# Patient Record
Sex: MALE | Race: WHITE | Employment: FULL TIME | URBAN - METROPOLITAN AREA
[De-identification: names, ages, dates, MRNs, and addresses within clinical notes are randomized per-mention and may not be internally consistent; named-entity substitution may affect disease eponyms.]

---

## 2021-01-27 ENCOUNTER — HOSPITAL ENCOUNTER (OUTPATIENT)
Age: 81
Setting detail: OUTPATIENT SURGERY
Discharge: HOME | End: 2021-01-27
Attending: INTERNAL MEDICINE | Admitting: INTERNAL MEDICINE
Payer: MEDICARE

## 2021-01-27 VITALS
RESPIRATION RATE: 20 BRPM | SYSTOLIC BLOOD PRESSURE: 145 MMHG | DIASTOLIC BLOOD PRESSURE: 81 MMHG | BODY MASS INDEX: 35.06 KG/M2 | HEART RATE: 64 BPM | TEMPERATURE: 98.3 F | HEIGHT: 75 IN | OXYGEN SATURATION: 95 % | WEIGHT: 282 LBS

## 2021-01-27 DIAGNOSIS — I25.10 CORONARY ARTERY DISEASE DUE TO LIPID RICH PLAQUE: ICD-10-CM

## 2021-01-27 DIAGNOSIS — I25.83 CORONARY ARTERY DISEASE DUE TO LIPID RICH PLAQUE: ICD-10-CM

## 2021-01-27 LAB
ANION GAP SERPL CALC-SCNC: 7 MMOL/L (ref 5–15)
BASOPHILS # BLD: 0 K/UL (ref 0–0.1)
BASOPHILS NFR BLD: 1 % (ref 0–1)
BUN SERPL-MCNC: 14 MG/DL (ref 6–20)
BUN/CREAT SERPL: 17 (ref 12–20)
CALCIUM SERPL-MCNC: 9.4 MG/DL (ref 8.5–10.1)
CHLORIDE SERPL-SCNC: 110 MMOL/L (ref 97–108)
CO2 SERPL-SCNC: 28 MMOL/L (ref 21–32)
CREAT SERPL-MCNC: 0.83 MG/DL (ref 0.7–1.3)
DIFFERENTIAL METHOD BLD: ABNORMAL
EOSINOPHIL # BLD: 0.1 K/UL (ref 0–0.4)
EOSINOPHIL NFR BLD: 2 % (ref 0–7)
ERYTHROCYTE [DISTWIDTH] IN BLOOD BY AUTOMATED COUNT: 12.9 % (ref 11.5–14.5)
GLUCOSE SERPL-MCNC: 109 MG/DL (ref 65–100)
HCT VFR BLD AUTO: 44.3 % (ref 36.6–50.3)
HGB BLD-MCNC: 14.5 G/DL (ref 12.1–17)
IMM GRANULOCYTES # BLD AUTO: 0 K/UL (ref 0–0.04)
IMM GRANULOCYTES NFR BLD AUTO: 0 % (ref 0–0.5)
LYMPHOCYTES # BLD: 1.2 K/UL (ref 0.8–3.5)
LYMPHOCYTES NFR BLD: 22 % (ref 12–49)
MCH RBC QN AUTO: 30 PG (ref 26–34)
MCHC RBC AUTO-ENTMCNC: 32.7 G/DL (ref 30–36.5)
MCV RBC AUTO: 91.5 FL (ref 80–99)
MONOCYTES # BLD: 0.6 K/UL (ref 0–1)
MONOCYTES NFR BLD: 12 % (ref 5–13)
NEUTS SEG # BLD: 3.5 K/UL (ref 1.8–8)
NEUTS SEG NFR BLD: 63 % (ref 32–75)
NRBC # BLD: 0 K/UL (ref 0–0.01)
NRBC BLD-RTO: 0 PER 100 WBC
PLATELET # BLD AUTO: 145 K/UL (ref 150–400)
PMV BLD AUTO: 9.6 FL (ref 8.9–12.9)
POTASSIUM SERPL-SCNC: 4 MMOL/L (ref 3.5–5.1)
RBC # BLD AUTO: 4.84 M/UL (ref 4.1–5.7)
SODIUM SERPL-SCNC: 145 MMOL/L (ref 136–145)
WBC # BLD AUTO: 5.5 K/UL (ref 4.1–11.1)

## 2021-01-27 PROCEDURE — C1894 INTRO/SHEATH, NON-LASER: HCPCS | Performed by: INTERNAL MEDICINE

## 2021-01-27 PROCEDURE — C1769 GUIDE WIRE: HCPCS | Performed by: INTERNAL MEDICINE

## 2021-01-27 PROCEDURE — 93458 L HRT ARTERY/VENTRICLE ANGIO: CPT | Performed by: INTERNAL MEDICINE

## 2021-01-27 PROCEDURE — 99153 MOD SED SAME PHYS/QHP EA: CPT | Performed by: INTERNAL MEDICINE

## 2021-01-27 PROCEDURE — 77030010221 HC SPLNT WR POS TELE -B: Performed by: INTERNAL MEDICINE

## 2021-01-27 PROCEDURE — 80048 BASIC METABOLIC PNL TOTAL CA: CPT

## 2021-01-27 PROCEDURE — 85025 COMPLETE CBC W/AUTO DIFF WBC: CPT

## 2021-01-27 PROCEDURE — 77030019569 HC BND COMPR RAD TERU -B: Performed by: INTERNAL MEDICINE

## 2021-01-27 PROCEDURE — 99152 MOD SED SAME PHYS/QHP 5/>YRS: CPT | Performed by: INTERNAL MEDICINE

## 2021-01-27 PROCEDURE — 77030004532 HC CATH ANGI DX IMP BSC -A: Performed by: INTERNAL MEDICINE

## 2021-01-27 PROCEDURE — 74011250636 HC RX REV CODE- 250/636: Performed by: INTERNAL MEDICINE

## 2021-01-27 PROCEDURE — 74011000250 HC RX REV CODE- 250: Performed by: INTERNAL MEDICINE

## 2021-01-27 PROCEDURE — 74011000636 HC RX REV CODE- 636: Performed by: INTERNAL MEDICINE

## 2021-01-27 PROCEDURE — 36415 COLL VENOUS BLD VENIPUNCTURE: CPT

## 2021-01-27 RX ORDER — ZINC GLUCONATE 10 MG
LOZENGE ORAL
COMMUNITY

## 2021-01-27 RX ORDER — NITROGLYCERIN 0.4 MG/1
0.4 TABLET SUBLINGUAL AS NEEDED
Status: DISCONTINUED | OUTPATIENT
Start: 2021-01-27 | End: 2021-01-27 | Stop reason: HOSPADM

## 2021-01-27 RX ORDER — SODIUM CHLORIDE 9 MG/ML
3 INJECTION, SOLUTION INTRAVENOUS CONTINUOUS
Status: DISPENSED | OUTPATIENT
Start: 2021-01-27 | End: 2021-01-27

## 2021-01-27 RX ORDER — GUAIFENESIN 100 MG/5ML
81 LIQUID (ML) ORAL DAILY
Qty: 30 TAB | Refills: 5 | Status: SHIPPED | OUTPATIENT
Start: 2021-01-27

## 2021-01-27 RX ORDER — HEPARIN SODIUM 1000 [USP'U]/ML
INJECTION, SOLUTION INTRAVENOUS; SUBCUTANEOUS AS NEEDED
Status: DISCONTINUED | OUTPATIENT
Start: 2021-01-27 | End: 2021-01-27 | Stop reason: HOSPADM

## 2021-01-27 RX ORDER — ACETAMINOPHEN 325 MG/1
650 TABLET ORAL
Status: DISCONTINUED | OUTPATIENT
Start: 2021-01-27 | End: 2021-01-27 | Stop reason: HOSPADM

## 2021-01-27 RX ORDER — FENTANYL CITRATE 50 UG/ML
INJECTION, SOLUTION INTRAMUSCULAR; INTRAVENOUS AS NEEDED
Status: DISCONTINUED | OUTPATIENT
Start: 2021-01-27 | End: 2021-01-27 | Stop reason: HOSPADM

## 2021-01-27 RX ORDER — PETROLATUM,WHITE/LANOLIN
500 OINTMENT (GRAM) TOPICAL DAILY
COMMUNITY

## 2021-01-27 RX ORDER — ATORVASTATIN CALCIUM 10 MG/1
10 TABLET, FILM COATED ORAL DAILY
COMMUNITY

## 2021-01-27 RX ORDER — HEPARIN SODIUM 200 [USP'U]/100ML
INJECTION, SOLUTION INTRAVENOUS
Status: COMPLETED | OUTPATIENT
Start: 2021-01-27 | End: 2021-01-27

## 2021-01-27 RX ORDER — MIDAZOLAM HYDROCHLORIDE 1 MG/ML
INJECTION, SOLUTION INTRAMUSCULAR; INTRAVENOUS AS NEEDED
Status: DISCONTINUED | OUTPATIENT
Start: 2021-01-27 | End: 2021-01-27 | Stop reason: HOSPADM

## 2021-01-27 RX ORDER — GLUCOSAM/CHONDRO/HERB 149/HYAL 750-100 MG
1 TABLET ORAL DAILY
COMMUNITY

## 2021-01-27 RX ORDER — SODIUM CHLORIDE 9 MG/ML
1.5 INJECTION, SOLUTION INTRAVENOUS CONTINUOUS
Status: DISCONTINUED | OUTPATIENT
Start: 2021-01-27 | End: 2021-01-27 | Stop reason: HOSPADM

## 2021-01-27 RX ORDER — VERAPAMIL HYDROCHLORIDE 2.5 MG/ML
INJECTION, SOLUTION INTRAVENOUS AS NEEDED
Status: DISCONTINUED | OUTPATIENT
Start: 2021-01-27 | End: 2021-01-27 | Stop reason: HOSPADM

## 2021-01-27 RX ORDER — ASCORBIC ACID 500 MG
1000 TABLET ORAL
COMMUNITY

## 2021-01-27 RX ORDER — GLUCOSAMINE SULFATE 1500 MG
1000 POWDER IN PACKET (EA) ORAL DAILY
COMMUNITY

## 2021-01-27 RX ORDER — LIDOCAINE HYDROCHLORIDE 10 MG/ML
INJECTION INFILTRATION; PERINEURAL AS NEEDED
Status: DISCONTINUED | OUTPATIENT
Start: 2021-01-27 | End: 2021-01-27 | Stop reason: HOSPADM

## 2021-01-27 RX ADMIN — SODIUM CHLORIDE 1.5 ML/KG/HR: 900 INJECTION, SOLUTION INTRAVENOUS at 09:57

## 2021-01-27 RX ADMIN — SODIUM CHLORIDE 3 ML/KG/HR: 9 INJECTION, SOLUTION INTRAVENOUS at 08:00

## 2021-01-27 RX ADMIN — SODIUM CHLORIDE 1.5 ML/KG/HR: 900 INJECTION, SOLUTION INTRAVENOUS at 08:59

## 2021-01-27 NOTE — PROGRESS NOTES
Cardiac Cath Lab Procedure Area Arrival Note:    Caro Landrum arrived to Cardiac Cath Lab, Procedure Area. Patient identifiers verified with NAME and DATE OF BIRTH. Procedure verified with patient. Consent forms verified. Allergies verified. Patient informed of procedure and plan of care. Questions answered with review. Patient voiced understanding of procedure and plan of care. Patient on cardiac monitor, non-invasive blood pressure, SPO2 monitor. On RA and placed on O2 @ 2 lpm via NC.  IV of NSS on pump at 75 ml/hr. Patient status doing well without problems. Patient is A&Ox 4. Patient reports no complaints of chest pain or shortness of breath. Patient medicated during procedure with orders obtained and verified by Dr. Nora Jhaveri. Refer to patients Cardiac Cath Lab PROCEDURE REPORT for vital signs, assessment, status, and response during procedure, printed at end of case. Printed report on chart or scanned into chart. 0930 Transfer to [facility]3] RR from Procedure Area    Verbal report given to YOLI Pinzon on Caro Landrum being transferred to Cardiac Cath Lab RR for routine progression of care   Patient is post C procedure. Patient stable upon transfer to . Report consisted of patients Situation, Background, Assessment and   Recommendations(SBAR). Information from the following report(s) SBAR, Procedure Summary and MAR was reviewed with the receiving nurse. Opportunity for questions and clarification was provided. Patient medicated during procedure with orders obtained and verified by Dr. Nora Jhaveri. Refer to patient PROCEDURE REPORT for vital signs, assessment, status, and response during procedure.

## 2021-01-27 NOTE — DISCHARGE INSTRUCTIONS
Radial Cardiac Catheterization / Angiography Discharge Instructions    It is normal to feel tired the first couple days. Take it easy and follow the physicians instructions. CHECK THE CATHETER INSERTION SITE DAILY:  Remove the wrist dressing 24 hours after the procedure. You may shower 24 hours after the procedure. Wash with soap and water and pat dry. Gentle cleaning of the site with soap and water is sufficient, cover with a dry clean dressing or bandage. Do not apply creams or powders to the area. No soaking the wrist for 3 days. Cover the puncture site after 24 hours post-procedure bandaid    CALL THE PHYSICIAN:  If the site becomes red, swollen or feels warm to the touch. If there is bleeding or drainage or if there is unusual pain at the radial site. If there is any minor oozing, you may apply a band-aid and remove after 12 hours. If the bleeding continues, hold pressure with the middle finger against the puncture site and the thumb against the back of the wrist, call 911 to be transported to the hospital.  DO NOT DRIVE YOURSELF, [facility] 022. ACTIVITY:  For the first 24 hours do not manipulate the wrist.  No lifting, pushing or pulling over 3-5 pounds with the affected wrist for 7 days and no straining the insertion site. Do not lift grocery bags or the garbage can, do not run the vacuum  or  for 7 days. Start with short walks as in the hospital and gradually increase as tolerated each day. It is recommended to walk 30 minutes 5-7 days per week. Follow your physicians instructions on activity. Avoid walking outside in extremes of heat or cold. Walk inside when it is cold and windy or hot and humid. THINGS TO KEEP IN MIND:  No driving for at least 24 hours, or as designated by your physician. Limit the number of times you go up and down the stairs  Take rests and pace yourself with activity.   Be careful and do not strain with bowel movements. MEDICATIONS:  Take all medications as prescribed. Call your physician if you have any questions. Keep an updated list of your medications with you at all times and give a list to your physician and pharmacist.    SIGNS AND SYMPTOMS:  Be cautions of symptoms of angina or recurrent symptoms such as chest discomfort, unusual shortness of breath or fatigue. These could be symptoms of restenosis, a new blockage or a heart attack. If your symptoms are relieved with rest it is still recommended that you notify your physician of recurrent chest pain or discomfort. AFTER CARE:  Follow up with you physician as instructed. Follow a heart healthy diet with proper portion control, daily stress management, daily      exercise, blood pressure and cholesterol control, and smoking cessation.

## 2021-01-27 NOTE — PROGRESS NOTES
Discharge instruction reveiwed with pt    1145 ambulated 100 ft, gait, voided, back to stretcher assisted with dressing. tolerated fluid, declined food  1215 d/c instructions reviewed with wife via the phone.     1230 discharged via w/c with wife, instructions and belongings

## 2021-01-27 NOTE — PROGRESS NOTES
Cardiac Cath Lab Recovery Arrival Note:      Nighat Norton arrived to Cardiac Cath Lab, Recovery Area. Staff introduced to patient. Patient identifiers verified with NAME and DATE OF BIRTH. Procedure verified with patient. Consent forms reviewed and signed by patient or authorized representative and verified. Allergies verified. Patient and family oriented to department. Patient and family informed of procedure and plan of care. Questions answered with review. Patient prepped for procedure, per orders from physician, prior to arrival.    Patient on cardiac monitor, non-invasive blood pressure, SPO2 monitor. On room air. Patient is A&Ox 4. Patient reports no complaints. Patient in stretcher, in low position, with side rails up, call bell within reach, patient instructed to call if assistance as needed. Patient prep in: [address], [place]. Patient family has pager # [phone]  Family in: hospital/wife. Prep by: Isaías Casanova RN    12 Dr Kin Shone in to talk with pt.   Pre cath teaching completed

## 2021-01-27 NOTE — PROGRESS NOTES
TRANSFER - IN REPORT:    Verbal report received from TARYN Odonnell,/  valerie Scott on Nereyda Funk  being received from procedure for routine progression of care. Report consisted of patients Situation, Background, Assessment and Recommendations(SBAR). Information from the following report(s) Procedure Summary, MAR, Recent Results and Med Rec Status was reviewed with the receiving clinician. Opportunity for questions and clarification was provided. Assessment completed upon patients arrival to [address] [place] [address] and care assumed. Cardiac Cath Lab Recovery Arrival Note:    Nereyda Funk arrived to [facility] recovery area. Patient procedure= LHC/radial. Patient on cardiac monitor, non-invasive blood pressure, SPO2 monitor. On room air. IV  of nacl on pump at 192 ml/hr. Patient status doing well without problems. Patient is A&Ox 4. Patient reports no complaints. PROCEDURE SITE CHECK:    Procedure site:without any bleeding and or hematoma, no pain/discomfort reported at procedure site. No change in patient status. Continue to monitor patient and status.     Dr Adriane Carreon talked with wife

## 2022-01-06 ENCOUNTER — HOSPITAL ENCOUNTER (OUTPATIENT)
Dept: GENERAL RADIOLOGY | Age: 82
Discharge: HOME | End: 2022-01-06
Payer: MEDICARE

## 2022-01-06 ENCOUNTER — TRANSCRIBE ORDER (OUTPATIENT)
Dept: GENERAL RADIOLOGY | Age: 82
End: 2022-01-06

## 2022-01-06 DIAGNOSIS — R29.898 LEFT LEG WEAKNESS: Primary | ICD-10-CM

## 2022-01-06 DIAGNOSIS — R29.898 LEFT LEG WEAKNESS: ICD-10-CM

## 2022-01-06 PROCEDURE — 72100 X-RAY EXAM L-S SPINE 2/3 VWS: CPT

## 2022-02-02 ENCOUNTER — TRANSCRIBE ORDER (OUTPATIENT)
Dept: SCHEDULING | Age: 82
End: 2022-02-02

## 2022-02-02 DIAGNOSIS — M54.16 LUMBAR RADICULOPATHY: ICD-10-CM

## 2022-02-02 DIAGNOSIS — M51.36 DDD (DEGENERATIVE DISC DISEASE), LUMBAR: ICD-10-CM

## 2022-02-02 DIAGNOSIS — M54.50 LOW BACK PAIN: Primary | ICD-10-CM

## 2022-02-02 DIAGNOSIS — M47.816 LUMBAR FACET ARTHROPATHY: ICD-10-CM

## 2022-02-09 ENCOUNTER — HOSPITAL ENCOUNTER (OUTPATIENT)
Dept: MRI IMAGING | Age: 82
Discharge: HOME | End: 2022-02-09
Attending: ORTHOPAEDIC SURGERY
Payer: MEDICARE

## 2022-02-09 DIAGNOSIS — M54.50 LOW BACK PAIN: ICD-10-CM

## 2022-02-09 DIAGNOSIS — M51.36 DDD (DEGENERATIVE DISC DISEASE), LUMBAR: ICD-10-CM

## 2022-02-09 DIAGNOSIS — M54.16 LUMBAR RADICULOPATHY: ICD-10-CM

## 2022-02-09 DIAGNOSIS — M47.816 LUMBAR FACET ARTHROPATHY: ICD-10-CM

## 2022-02-09 PROCEDURE — 72148 MRI LUMBAR SPINE W/O DYE: CPT

## 2025-03-13 ENCOUNTER — HOSPITAL ENCOUNTER (OUTPATIENT)
Age: 85
Discharge: HOME | End: 2025-03-16
Payer: MEDICARE

## 2025-03-13 DIAGNOSIS — R79.89 OTHER SPECIFIED ABNORMAL FINDINGS OF BLOOD CHEMISTRY: ICD-10-CM

## 2025-03-13 PROCEDURE — 76705 ECHO EXAM OF ABDOMEN: CPT

## (undated) DEVICE — SPLINT WR POS F/ARTERIAL ACC -- BX/10

## (undated) DEVICE — GUIDEWIRE VASC L260CM DIA0.035IN TIP L3MM STD EXCHG PTFE J

## (undated) DEVICE — GOWN,NON-REINFORCED,XXL: Brand: MEDLINE

## (undated) DEVICE — ANGIOGRAPHIC CATHETER: Brand: IMPULSE™

## (undated) DEVICE — KIT MED IMAG CNTRST AGNT W/ IOPAMIDOL REUSE

## (undated) DEVICE — GLIDESHEATH SLENDER STAINLESS STEEL KIT: Brand: GLIDESHEATH SLENDER

## (undated) DEVICE — TR BAND RADIAL ARTERY COMPRESSION DEVICE: Brand: TR BAND

## (undated) DEVICE — KIT MFLD ISOLATN NACL CNTRST PRT TBNG SPIK W/ PRSS TRNSDUC

## (undated) DEVICE — PACK PROCEDURE SURG HRT CATH

## (undated) DEVICE — WASTE KIT - ST MARY: Brand: MEDLINE INDUSTRIES, INC.